# Patient Record
Sex: FEMALE | Race: WHITE
[De-identification: names, ages, dates, MRNs, and addresses within clinical notes are randomized per-mention and may not be internally consistent; named-entity substitution may affect disease eponyms.]

---

## 2021-12-22 ENCOUNTER — HOSPITAL ENCOUNTER (EMERGENCY)
Dept: HOSPITAL 75 - ER | Age: 41
Discharge: HOME | End: 2021-12-22
Payer: MEDICAID

## 2021-12-22 VITALS — HEIGHT: 60.98 IN | WEIGHT: 124.78 LBS | BODY MASS INDEX: 23.56 KG/M2

## 2021-12-22 VITALS — DIASTOLIC BLOOD PRESSURE: 50 MMHG | SYSTOLIC BLOOD PRESSURE: 128 MMHG

## 2021-12-22 DIAGNOSIS — Z20.822: ICD-10-CM

## 2021-12-22 DIAGNOSIS — J10.1: Primary | ICD-10-CM

## 2021-12-22 DIAGNOSIS — F17.210: ICD-10-CM

## 2021-12-22 DIAGNOSIS — J98.01: ICD-10-CM

## 2021-12-22 DIAGNOSIS — R07.81: ICD-10-CM

## 2021-12-22 LAB
ALBUMIN SERPL-MCNC: 4.2 GM/DL (ref 3.2–4.5)
ALP SERPL-CCNC: 56 U/L (ref 40–136)
ALT SERPL-CCNC: 19 U/L (ref 0–55)
APTT BLD: 31 SEC (ref 24–35)
BASOPHILS # BLD AUTO: 0 10^3/UL (ref 0–0.1)
BASOPHILS NFR BLD AUTO: 1 % (ref 0–10)
BILIRUB SERPL-MCNC: 0.2 MG/DL (ref 0.1–1)
BUN/CREAT SERPL: 10
CALCIUM SERPL-MCNC: 9 MG/DL (ref 8.5–10.1)
CHLORIDE SERPL-SCNC: 105 MMOL/L (ref 98–107)
CO2 SERPL-SCNC: 22 MMOL/L (ref 21–32)
CREAT SERPL-MCNC: 0.68 MG/DL (ref 0.6–1.3)
EOSINOPHIL # BLD AUTO: 0 10^3/UL (ref 0–0.3)
EOSINOPHIL NFR BLD AUTO: 0 % (ref 0–10)
GFR SERPLBLD BASED ON 1.73 SQ M-ARVRAT: 94 ML/MIN
GLUCOSE SERPL-MCNC: 77 MG/DL (ref 70–105)
HCT VFR BLD CALC: 42 % (ref 35–52)
HGB BLD-MCNC: 13.7 G/DL (ref 11.5–16)
INR PPP: 1 (ref 0.8–1.4)
LYMPHOCYTES # BLD AUTO: 1.1 10^3/UL (ref 1–4)
LYMPHOCYTES NFR BLD AUTO: 21 % (ref 12–44)
MAGNESIUM SERPL-MCNC: 2.1 MG/DL (ref 1.6–2.4)
MANUAL DIFFERENTIAL PERFORMED BLD QL: NO
MCH RBC QN AUTO: 32 PG (ref 25–34)
MCHC RBC AUTO-ENTMCNC: 33 G/DL (ref 32–36)
MCV RBC AUTO: 96 FL (ref 80–99)
MONOCYTES # BLD AUTO: 0.6 10^3/UL (ref 0–1)
MONOCYTES NFR BLD AUTO: 11 % (ref 0–12)
NEUTROPHILS # BLD AUTO: 3.6 10^3/UL (ref 1.8–7.8)
NEUTROPHILS NFR BLD AUTO: 67 % (ref 42–75)
PLATELET # BLD: 344 10^3/UL (ref 130–400)
PMV BLD AUTO: 8.3 FL (ref 9–12.2)
POTASSIUM SERPL-SCNC: 4.4 MMOL/L (ref 3.6–5)
PROT SERPL-MCNC: 7.3 GM/DL (ref 6.4–8.2)
PROTHROMBIN TIME: 13.5 SEC (ref 12.2–14.7)
SODIUM SERPL-SCNC: 137 MMOL/L (ref 135–145)
WBC # BLD AUTO: 5.4 10^3/UL (ref 4.3–11)

## 2021-12-22 PROCEDURE — 71045 X-RAY EXAM CHEST 1 VIEW: CPT

## 2021-12-22 PROCEDURE — 93005 ELECTROCARDIOGRAM TRACING: CPT

## 2021-12-22 PROCEDURE — 83874 ASSAY OF MYOGLOBIN: CPT

## 2021-12-22 PROCEDURE — 85730 THROMBOPLASTIN TIME PARTIAL: CPT

## 2021-12-22 PROCEDURE — 86141 C-REACTIVE PROTEIN HS: CPT

## 2021-12-22 PROCEDURE — 87636 SARSCOV2 & INF A&B AMP PRB: CPT

## 2021-12-22 PROCEDURE — 36415 COLL VENOUS BLD VENIPUNCTURE: CPT

## 2021-12-22 PROCEDURE — 83880 ASSAY OF NATRIURETIC PEPTIDE: CPT

## 2021-12-22 PROCEDURE — 85379 FIBRIN DEGRADATION QUANT: CPT

## 2021-12-22 PROCEDURE — 85610 PROTHROMBIN TIME: CPT

## 2021-12-22 PROCEDURE — 84703 CHORIONIC GONADOTROPIN ASSAY: CPT

## 2021-12-22 PROCEDURE — 84484 ASSAY OF TROPONIN QUANT: CPT

## 2021-12-22 PROCEDURE — 83735 ASSAY OF MAGNESIUM: CPT

## 2021-12-22 PROCEDURE — 85025 COMPLETE CBC W/AUTO DIFF WBC: CPT

## 2021-12-22 PROCEDURE — 80053 COMPREHEN METABOLIC PANEL: CPT

## 2021-12-22 NOTE — DIAGNOSTIC IMAGING REPORT
INDICATION: Chest pain and chest tightness.



TIME OF EXAM: 9:36 AM



No prior studies are available for comparison.



FINDINGS: The heart size is normal. The pulmonary vascularity is

unremarkable. The lungs are clear. No infiltrate, effusion or

pneumothorax is detected.



IMPRESSION: No acute cardiopulmonary process is detected.



Dictated by: 



  Dictated on workstation # [id number]

## 2021-12-22 NOTE — ED CHEST PAIN
General


Chief Complaint:  Chest Pain


Stated Complaint:  CHEST TIGHTNESS


Nursing Triage Note:  


PT AMB TO RM 4 WITH COMPLAINT OF CHEST TIGHTNESS. STATES STARTED MONDAY AFTER 


PLAYING IN THE PARK WITH CHILDREN.


Source:  patient


Exam Limitations:  no limitations





History of Present Illness


Date Seen by Provider:  Dec 22, 2021


Time Seen by Provider:  09:12


Initial Comments


This 41-year-old woman presents to the emergency room with complaints of chest 

tightness and discomfort, particularly with deep breathing.  She first noticed 

this after playing at the park with her children 2 days ago.  Symptoms have 

persisted.  She denies any fever, chills, nausea, vomiting, diarrhea, or change 

in taste or smell.  She has not been vaccinated for COVID-19 or influenza.  

Symptoms are worse when lying flat and she is not sleeping.  She is a smoker but

denies drug or alcohol use.  She denies any significant health history.  [facility] in 

[place] is her primary care office.  She seems rather fidgety and anxious 

about her condition.





Allergies and Home Medications


Allergies


Coded Allergies:  


     No Known Drug Allergies (Unverified , 12/22/21)





Patient Home Medication List


Home Medication List Reviewed:  Yes


Oseltamivir Phosphate (Tamiflu) 75 Mg Cap, 75 MG PO BID


   Prescribed by: DIMAS ALCANTARA on 12/22/21 1113


Prednisone (Prednisone) 20 Mg Tab, 40 MG PO DAILY


   Prescribed by: DIMAS ALCANTARA on 12/22/21 1113





Review of Systems


Review of Systems


Constitutional:  no symptoms reported


EENTM:  No Symptoms Reported


Respiratory:  See HPI


Cardiovascular:  See HPI


Gastrointestinal:  No Symptoms Reported


Genitourinary:  No Symptoms Reported


Musculoskeletal:  no symptoms reported


Skin:  no symptoms reported


Psychiatric/Neurological:  See HPI


Endocrine:  No Symptoms Reported


Hematologic/Lymphatic:  No Symptoms Reported





Past Medical-Social-Family Hx


Patient Social History


Tobacco Use?:  Yes


Tobacco type used:  Cigarettes


Smoking Status:  Current Everyday Smoker


Use of E-Cig and/or Vaping dev:  No


Substance use?:  No


Alcohol Use?:  No


Pt feels they are or have been:  No





Immunizations Up To Date


Influenza Vaccine Up-to-Date:  No; Not Current





Past Medical History


Surgeries:  No


Respiratory:  No


Cardiac:  No


Neurological:  No


Pregnant:  No


Genitourinary:  No


Gastrointestinal:  No


Musculoskeletal:  No


Endocrine:  No


HEENT:  No


Cancer:  No


Psychosocial:  No


Integumentary:  No





Physical Exam


Vital Signs





Vital Signs - First Documented








 12/22/21





 09:13


 


Temp 36.9


 


Pulse 91


 


Resp 16


 


B/P (MAP) 123/53 (76)


 


Pulse Ox 96


 


O2 Delivery Room Air





Capillary Refill : Less Than 3 Seconds


Height, Weight, BMI


Height: '"


Weight: lbs. oz. kg; 23.00 BMI


Method:


General Appearance:  WD/WN, Anxious


HEENT:  PERRL/EOMI, TMs Normal, Normal ENT Inspection, Pharynx Normal


Neck:  Normal Inspection


Respiratory:  No Accessory Muscle Use, No Respiratory Distress; No Crackles; 

Wheezing, Other (Prolonged expiratory phase)


Cardiovascular:  Regular Rate, Rhythm, No Edema, No Murmur


Gastrointestinal:  Non Tender, Soft; No Distended


Extremity:  Normal Inspection, Non Tender, No Calf Tenderness, No Pedal Edema


Neurologic/Psychiatric:  Alert, Oriented x3, No Motor/Sensory Deficits, CNs II-

XII Norm as Tested, Other (Mildly anxious)


Skin:  Normal Color, Warm/Dry





Progress/Results/Core Measures


Results/Orders


Lab Results





Laboratory Tests








Test


 12/22/21


10:04 12/22/21


10:06 Range/Units


 


 


White Blood Count


 5.4 


 


 4.3-11.0


10^3/uL


 


Red Blood Count


 4.31 


 


 3.80-5.11


10^6/uL


 


Hemoglobin 13.7   11.5-16.0  g/dL


 


Hematocrit 42   35-52  %


 


Mean Corpuscular Volume 96   80-99  fL


 


Mean Corpuscular Hemoglobin 32   25-34  pg


 


Mean Corpuscular Hemoglobin


Concent 33 


 


 32-36  g/dL





 


Red Cell Distribution Width 13.1   10.0-14.5  %


 


Platelet Count


 344 


 


 130-400


10^3/uL


 


Mean Platelet Volume 8.3 L  9.0-12.2  fL


 


Immature Granulocyte % (Auto) 0    %


 


Neutrophils (%) (Auto) 67   42-75  %


 


Lymphocytes (%) (Auto) 21   12-44  %


 


Monocytes (%) (Auto) 11   0-12  %


 


Eosinophils (%) (Auto) 0   0-10  %


 


Basophils (%) (Auto) 1   0-10  %


 


Neutrophils # (Auto)


 3.6 


 


 1.8-7.8


10^3/uL


 


Lymphocytes # (Auto)


 1.1 


 


 1.0-4.0


10^3/uL


 


Monocytes # (Auto)


 0.6 


 


 0.0-1.0


10^3/uL


 


Eosinophils # (Auto)


 0.0 


 


 0.0-0.3


10^3/uL


 


Basophils # (Auto)


 0.0 


 


 0.0-0.1


10^3/uL


 


Immature Granulocyte # (Auto)


 0.0 


 


 0.0-0.1


10^3/uL


 


Prothrombin Time 13.5   12.2-14.7  SEC


 


INR Comment 1.0   0.8-1.4  


 


Activated Partial


Thromboplast Time 31 


 


 24-35  SEC





 


D-Dimer


 < 0.27 


 


 0.00-0.49


UG/ML


 


Sodium Level 137   135-145  MMOL/L


 


Potassium Level 4.4   3.6-5.0  MMOL/L


 


Chloride Level 105     MMOL/L


 


Carbon Dioxide Level 22   21-32  MMOL/L


 


Anion Gap 10   5-14  MMOL/L


 


Blood Urea Nitrogen 7   7-18  MG/DL


 


Creatinine


 0.68 


 


 0.60-1.30


MG/DL


 


Estimat Glomerular Filtration


Rate 94 


 


  





 


BUN/Creatinine Ratio 10    


 


Glucose Level 77     MG/DL


 


Calcium Level 9.0   8.5-10.1  MG/DL


 


Corrected Calcium 8.8   8.5-10.1  MG/DL


 


Magnesium Level 2.1   1.6-2.4  MG/DL


 


Total Bilirubin 0.2   0.1-1.0  MG/DL


 


Aspartate Amino Transf


(AST/SGOT) 22 


 


 5-34  U/L





 


Alanine Aminotransferase


(ALT/SGPT) 19 


 


 0-55  U/L





 


Alkaline Phosphatase 56     U/L


 


Myoglobin


 29.5 


 


 10.0-92.0


NG/ML


 


Troponin I < 0.028   <0.028  NG/ML


 


C-Reactive Protein High


Sensitivity 1.75 H


 


 0.00-0.50


MG/DL


 


B-Type Natriuretic Peptide 33.7   <100.0  PG/ML


 


Total Protein 7.3   6.4-8.2  GM/DL


 


Albumin 4.2   3.2-4.5  GM/DL


 


Serum Pregnancy Test,


Qualitative NEGATIVE 


 


 NEGATIVE  





 


Influenza Type A (RT-PCR)  Detected H Not Detecte  


 


Influenza Type B (RT-PCR)  Not Detected  Not Detecte  


 


SARS-CoV-2 RNA (RT-PCR)  Not Detected  Not Detecte  








My Orders





Orders - DIMAS CRUZ MD


Cbc With Automated Diff (12/22/21 09:12)


Magnesium (12/22/21 09:12)


Chest 1 View, Ap/Pa Only (12/22/21 09:12)


Ekg Tracing (12/22/21 09:12)


Comprehensive Metabolic Panel (12/22/21 09:12)


Myoglobin Serum (12/22/21 09:12)


Protime With Inr (12/22/21 09:12)


Partial Thromboplastin Time (12/22/21 09:12)


O2 (12/22/21 09:12)


Monitor-Rhythm Ecg Trace Only (12/22/21 09:12)


Ed Iv/Invasive Line Start (12/22/21 09:12)


Troponin I [place] (12/22/21 09:12)


Albuterol Inhaler (Albuterol) (12/22/21 09:57)


Covid 19 Inhouse Test (12/22/21 09:57)


Influenza A And B By Pcr (12/22/21 09:57)


Bnp [place] (12/22/21 09:57)


Hs C Reactive Protein (12/22/21 09:57)


Fibrin Degradation Products (12/22/21 09:57)


Hcg,Qualitative Serum (12/22/21 09:57)


Albuterol Inhaler (Albuterol) (12/22/21 09:56)


Ketorolac Injection (Toradol Injection) (12/22/21 11:15)





Medications Given in ED





Current Medications








 Medications  Dose


 Ordered  Sig/Theodore


 Route  Start Time


 Stop Time Status Last Admin


Dose Admin


 


 Ketorolac


 Tromethamine  30 mg  ONCE  ONCE


 IVP  12/22/21 11:15


 12/22/21 11:16 DC 12/22/21 11:23


30 MG








Vital Signs/I&O











 12/22/21 12/22/21





 09:13 11:28


 


Temp 36.9 


 


Pulse 91 86


 


Resp 16 16


 


B/P (MAP) 123/53 (76) 128/50


 


Pulse Ox 96 96


 


O2 Delivery Room Air Room Air














Blood Pressure Mean:                    76











Progress


Progress Note :  


Progress Note


Patient appeared to have bronchospasm on exam.  Inhaler was used and did improve

her breathing.  Cardiac work-up was unremarkable.  Influenza a was detected on 

nasal swabs.  Patient was offered Tamiflu which was prescribed.  She was sent 

home with the inhaler.  Steroids were additionally prescribed.  Patient was 

encouraged to get vaccination for COVID-19 and to quit smoking.


Initial ECG Impression Date:  Dec 22, 2021


Initial ECG Impression Time:  09:29


Initial ECG Rate:  81


Initial ECG Rhythm:  Normal Sinus


Initial ECG Intervals:  Normal


Comment


Sinus rhythm with subtle ST changes likely representing early repolarization in 

this young patient.  No abnormal intervals or axis deviation.  No diagnostic 

ischemic ST elevation or depression.





Diagnostic Imaging





   Diagonstic Imaging:  Xray


   Plain Films/CT/US/NM/MRI:  chest





Departure


Impression





   Primary Impression:  


   Influenza A


   Additional Impressions:  


   Chest pain, pleuritic


   Bronchospasm


Disposition:  01 HOME, SELF-CARE


Condition:  Improved





Departure-Patient Inst.


Decision time for Depature:  11:00


Referrals:  


[facility] (PCP/Family)


Primary Care Physician


Patient Instructions:  Flu





Add. Discharge Instructions:  


Drink plenty of clear liquids to stay well-hydrated.


You may take ibuprofen up to 600 mg every 6 hours and/or Tylenol (acetaminophen)

up to 1000 mg every 6 hours as needed for pain, fever, or chest discomfort.


Use your inhaler up to 4 puffs in a 4-hour period of time for shortness of 

breath or wheezing.


Complete the entire 10 doses of Tamiflu.  Take all of the doses even if you are 

feeling better to avoid rebounding symptoms.


Avoid contact with others to avoid transmitting the flu virus.


Work toward quitting smoking as rapidly as possible.  Seek assistance from your 

primary care provider if you need help.


Consider obtaining Covid and influenza vaccines when you recover from this acute

illness.


Call with questions or concerns.


Return to the ER if you have worsening symptoms.





All discharge instructions reviewed with patient and/or family. Voiced 

understanding.


Scripts


Prednisone (Prednisone) 20 Mg Tab


40 MG PO DAILY, #6 TAB 0 Refills


   Prov: DIMAS CRUZ MD         12/22/21 


Oseltamivir Phosphate (Tamiflu) 75 Mg Cap


75 MG PO BID, #10 CAP


   Prov: DIMAS CRUZ MD         12/22/21





Copy


Copies To 1:   TRUDY WILSON JOSHUA T MD        Dec 22, 2021 11:12

## 2022-05-11 ENCOUNTER — HOSPITAL ENCOUNTER (EMERGENCY)
Dept: HOSPITAL 75 - ER | Age: 42
Discharge: HOME | End: 2022-05-11
Payer: MEDICAID

## 2022-05-11 VITALS — SYSTOLIC BLOOD PRESSURE: 99 MMHG | DIASTOLIC BLOOD PRESSURE: 47 MMHG

## 2022-05-11 VITALS — WEIGHT: 123.46 LBS | BODY MASS INDEX: 22.72 KG/M2 | HEIGHT: 61.81 IN

## 2022-05-11 DIAGNOSIS — N39.0: ICD-10-CM

## 2022-05-11 DIAGNOSIS — Z32.02: ICD-10-CM

## 2022-05-11 DIAGNOSIS — M51.36: Primary | ICD-10-CM

## 2022-05-11 DIAGNOSIS — F17.210: ICD-10-CM

## 2022-05-11 LAB
ALBUMIN SERPL-MCNC: 4.5 GM/DL (ref 3.2–4.5)
ALP SERPL-CCNC: 58 U/L (ref 40–136)
ALT SERPL-CCNC: 15 U/L (ref 0–55)
APTT PPP: YELLOW S
BACTERIA #/AREA URNS HPF: (no result) /HPF
BARBITURATES UR QL: NEGATIVE
BASOPHILS # BLD AUTO: 0.1 10^3/UL (ref 0–0.1)
BASOPHILS NFR BLD AUTO: 1 % (ref 0–10)
BENZODIAZ UR QL SCN: NEGATIVE
BILIRUB SERPL-MCNC: 0.3 MG/DL (ref 0.1–1)
BILIRUB UR QL STRIP: NEGATIVE
BUN/CREAT SERPL: 19
CALCIUM SERPL-MCNC: 9.6 MG/DL (ref 8.5–10.1)
CHLORIDE SERPL-SCNC: 106 MMOL/L (ref 98–107)
CO2 SERPL-SCNC: 25 MMOL/L (ref 21–32)
COCAINE UR QL: NEGATIVE
CREAT SERPL-MCNC: 0.75 MG/DL (ref 0.6–1.3)
EOSINOPHIL # BLD AUTO: 0 10^3/UL (ref 0–0.3)
EOSINOPHIL NFR BLD AUTO: 0 % (ref 0–10)
FIBRINOGEN PPP-MCNC: CLEAR MG/DL
GFR SERPLBLD BASED ON 1.73 SQ M-ARVRAT: 101 ML/MIN
GLUCOSE SERPL-MCNC: 85 MG/DL (ref 70–105)
GLUCOSE UR STRIP-MCNC: NEGATIVE MG/DL
HCG UR QL: NEGATIVE
HCT VFR BLD CALC: 43 % (ref 35–52)
HGB BLD-MCNC: 14.4 G/DL (ref 11.5–16)
KETONES UR QL STRIP: NEGATIVE
LEUKOCYTE ESTERASE UR QL STRIP: NEGATIVE
LYMPHOCYTES # BLD AUTO: 2.8 10^3/UL (ref 1–4)
LYMPHOCYTES NFR BLD AUTO: 33 % (ref 12–44)
MANUAL DIFFERENTIAL PERFORMED BLD QL: NO
MCH RBC QN AUTO: 31 PG (ref 25–34)
MCHC RBC AUTO-ENTMCNC: 33 G/DL (ref 32–36)
MCV RBC AUTO: 94 FL (ref 80–99)
METHADONE UR QL SCN: NEGATIVE
MONOCYTES # BLD AUTO: 0.4 10^3/UL (ref 0–1)
MONOCYTES NFR BLD AUTO: 5 % (ref 0–12)
NEUTROPHILS # BLD AUTO: 5.2 10^3/UL (ref 1.8–7.8)
NEUTROPHILS NFR BLD AUTO: 61 % (ref 42–75)
NITRITE UR QL STRIP: NEGATIVE
OPIATES UR QL SCN: NEGATIVE
OXYCODONE UR QL: NEGATIVE
PH UR STRIP: 6.5 [PH] (ref 5–9)
PLATELET # BLD: 458 10^3/UL (ref 130–400)
PMV BLD AUTO: 8.2 FL (ref 9–12.2)
POTASSIUM SERPL-SCNC: 4.2 MMOL/L (ref 3.6–5)
PROPOXYPH UR QL: NEGATIVE
PROT SERPL-MCNC: 7.5 GM/DL (ref 6.4–8.2)
PROT UR QL STRIP: NEGATIVE
RBC #/AREA URNS HPF: (no result) /HPF
SODIUM SERPL-SCNC: 140 MMOL/L (ref 135–145)
SP GR UR STRIP: <=1.005 (ref 1.02–1.02)
SQUAMOUS #/AREA URNS HPF: (no result) /HPF
TRICYCLICS UR QL SCN: NEGATIVE
WBC # BLD AUTO: 8.6 10^3/UL (ref 4.3–11)
WBC #/AREA URNS HPF: (no result) /HPF

## 2022-05-11 PROCEDURE — 87088 URINE BACTERIA CULTURE: CPT

## 2022-05-11 PROCEDURE — 80053 COMPREHEN METABOLIC PANEL: CPT

## 2022-05-11 PROCEDURE — 72131 CT LUMBAR SPINE W/O DYE: CPT

## 2022-05-11 PROCEDURE — 85025 COMPLETE CBC W/AUTO DIFF WBC: CPT

## 2022-05-11 PROCEDURE — 74176 CT ABD & PELVIS W/O CONTRAST: CPT

## 2022-05-11 PROCEDURE — 81000 URINALYSIS NONAUTO W/SCOPE: CPT

## 2022-05-11 PROCEDURE — 80306 DRUG TEST PRSMV INSTRMNT: CPT

## 2022-05-11 PROCEDURE — 36415 COLL VENOUS BLD VENIPUNCTURE: CPT

## 2022-05-11 PROCEDURE — 84703 CHORIONIC GONADOTROPIN ASSAY: CPT

## 2022-05-11 NOTE — DIAGNOSTIC IMAGING REPORT
PROCEDURE: CT urinary tract, rule out kidney stone.



TECHNIQUE: Multiple contiguous axial images were obtained through

the abdomen and pelvis without the use of intravenous contrast.

Auto Exposure Controls were utilized during the CT exam to meet

ALARA standards for radiation dose reduction. 



INDICATION: Right-sided low back pain beginning yesterday. Right

flank pain. Suspect kidney stones.



COMPARISON: None



FINDINGS:



There is dependent atelectasis in the lung bases. The heart is

normal in size.



The liver demonstrates no focal lesions on this noncontrast exam.

The spleen appears normal. The pancreas is unremarkable. The

adrenal glands appear normal.



The right kidney has a simple appearing cyst which measures 3.5

cm in diameter. There is no hydronephrosis. No obstructing

calculi are seen in the kidneys bilaterally.



The bowel loops are nondistended without obstruction. The

appendix is normal. The colon wall appears mildly thick, but this

is felt to be due to nondistention. No free fluid or free air is

seen. There is mild calcific atherosclerosis, somewhat greater

than expected for age.



No acute osseous abnormality is seen. There are degenerative

changes at L5-S1. Please refer to separate CT of the lumbar

spine.



IMPRESSION:

1. No renal calculi or hydronephrosis. Simple appearing cyst in

the right kidney.

2. Mild calcific atherosclerosis, greater than expected for age.



Dictated by: 



  Dictated on workstation # [id number]

## 2022-05-11 NOTE — DIAGNOSTIC IMAGING REPORT
PROCEDURE: CT lumbar spine without contrast.



TECHNIQUE: Multiple contiguous axial images were obtained through

the lumbar spine without the use of intravenous contrast.

Sagittal and coronal reformations were then performed. Auto

Exposure Controls were utilized during the CT exam to meet ALARA

standards for radiation dose reduction. 



INDICATION: Flank pain.



FINDINGS:



Kidneys are almost entirely in the field-of-view. There is a

partially visualized exophytic right renal cystic appearing

lesion but no radiopaque urinary tract stone. There is no

hydroureteronephrosis. The bladder below the field-of-view.



The aorta is nonaneurysmal. No paravertebral mass, hemorrhage or

fluid collection.



Lumbar statures are normal. The alignment anatomic. The pedicles

and pars are intact. Osteophyte disc material at L2-L3 results in

no significant canal or foraminal stenosis. Minimal degenerative

changes at L3-L4 and L4-L5 also without stenosis.



Substantial degenerative changes at the L5-S1 with the disc space

narrowing, endplate sclerosis, and marginal osteophytes. Findings

result in no significant canal stenosis, however, result in

moderate-to-severe left and mild-to-moderate right neural

foraminal narrowing.



IMPRESSION:

1. Normal alignment. No acute lumbar pathology. No fracture

identified. 

2. Degenerative changes greatest at the L5-S1 levels result in

left greater than right foraminal stenoses on a chronic basis. 

3. Cystic lesion right kidney partially visualized.



Dictated by: 



  Dictated on workstation # [id number]

## 2022-05-11 NOTE — ED BACK PAIN
General


Chief Complaint:  Back Problems


Stated Complaint:  PINCH NERVE


Nursing Triage Note:  


ARRIVED VIA WC TO ROOM 09 WITH RIGHT SIDED LOWER BACK PAIN STARTING YESTERDAY 


AM.  SHE THINKS SHE HAS PINCHED NERVE IN HER BACK.


Source of Information:  Patient





History of Present Illness


Date Seen by Provider:  May 11, 2022


Time Seen by Provider:  08:55


Initial Comments


PT ARRIVES VIA POV FROM HOME


C/O RIGHT LOWER BACK PAIN AND LOWER LUMBAR PAIN SINCE YESTERDAY MORNING, ON 

WAKING


NO RADIATION OF PAIN 


NO PARESTHESIAS OR MOTOR DEFICITS


NO LOSS OF BOWEL OR BLADDER CONTROL


NO URINARY SYMPTOMS


NO NAUSEA/VOMITING





NO INJURY OR UNUSUAL ACTIVITY. 





NO HISTORY OF SIMILAR


NO CHRONIC MEDICAL PROBLEMS





NO RELIEF WITH IBUPROFEN AT 0400 THIS AM





LMP--ENDED YESTERDAY. NORMAL. NO BIRTH CONTROL


Other Comments





PCP: JARON





Allergies and Home Medications


Allergies


Coded Allergies:  


     No Known Drug Allergies (Unverified , 12/22/21)





Patient Home Medication List


Nitrofurantoin Monohyd/M-Cryst (Macrobid 100 mg Capsule) 100 Mg Capsule, 1 TAB 

PO BID


   Prescribed by: SHALINI MCNULTY on 5/11/22 0957


Oseltamivir Phosphate (Tamiflu) 75 Mg Cap, 75 MG PO BID


   Prescribed by: DIMAS ALCANTARA on 12/22/21 1113


Prednisone (Prednisone) 20 Mg Tab, 40 MG PO DAILY


   Prescribed by: DIMAS ALCANTARA on 12/22/21 1113


Prednisone (Prednisone) 10 Mg Tab.ds.pk, 10 MG PO DAILY


   Prescribed by: SHALINI MCNULTY on 5/11/22 0957


Tizanidine HCl (Zanaflex) 4 Mg Capsule, 4 MG PO TID


   Prescribed by: SHALINI MCNULTY on 5/11/22 0957





Review of Systems


Constitutional:  no symptoms reported


Respiratory:  no symptoms reported


Cardiovascular:  no symptoms reported


Gastrointestinal:  no symptoms reported


Genitourinary:  no symptoms reported


Pregnant:  No


LMP:  May 4, 2022


Birth Control/STD Prophylaxis:  None


Musculoskeletal:  see HPI, back pain


Skin:  no symptoms reported


Psychiatric/Neurological:  No Symptoms Reported





Past Medical-Social-Family Hx


Patient Social History


Tobacco Use?:  Yes


Tobacco type used:  Cigarettes


Smoking Status:  Current Everyday Smoker


Substance use?:  No


Alcohol Use?:  Yes


Alcohol Frequency:  Once in a while





Past Medical History


Surgeries:  No


Respiratory:  No


Cardiac:  No


Neurological:  No


Genitourinary:  No


Gastrointestinal:  No


Musculoskeletal:  No


Endocrine:  No


HEENT:  No


Cancer:  No


Psychosocial:  No


Integumentary:  No


Blood Disorders:  No





Physical Exam


Vital Signs





Vital Signs - First Documented








 5/11/22





 08:45


 


Temp 36.1


 


Pulse 91


 


Resp 16


 


B/P (MAP) 98/70 (79)


 


Pulse Ox 95


 


O2 Delivery Room Air





Capillary Refill : Less Than 3 Seconds


Height, Weight, BMI


Height: '"


Weight: lbs. oz. kg; 22.00 BMI


Method:


General Appearance:  WD/WN, Other (LOOKS UNCOMFORTABLY, LAYING PARTIALLY ON LEFT

SIDE. )


Neck:  Normal Inspection


Cardiovascular:  Regular Rate, Rhythm, No Murmur


Respiratory:  Normal Breath Sounds


Gastrointestinal:  Non Tender, Soft


Back:  Other (LOWER LUMBAR TENDERNESS, AND PIN POINT TENDERNESS OVER RIGHT SI 

JOINT. )


Neurologic/Psychiatric:  Alert, Oriented x3, No Motor/Sensory Deficits, CNs II-

XII Norm as Tested


Skin:  Normal Color, Warm/Dry; No Rash; Tattoos/Piercings (MULITPLE TATTOOS)





Progress/Results/Core Measures


Results/Orders


Lab Results





Laboratory Tests








Test


 5/11/22


08:58 5/11/22


09:06 Range/Units


 


 


Urine Color YELLOW    


 


Urine Clarity CLEAR    


 


Urine pH 6.5   5-9  


 


Urine Specific Gravity <=1.005   1.016-1.022  


 


Urine Protein NEGATIVE   NEGATIVE  


 


Urine Glucose (UA) NEGATIVE   NEGATIVE  


 


Urine Ketones NEGATIVE   NEGATIVE  


 


Urine Nitrite NEGATIVE   NEGATIVE  


 


Urine Bilirubin NEGATIVE   NEGATIVE  


 


Urine Urobilinogen 0.2   < = 1.0  MG/DL


 


Urine Leukocyte Esterase NEGATIVE   NEGATIVE  


 


Urine RBC (Auto) 1+ H  NEGATIVE  


 


Urine RBC 5-10 H   /HPF


 


Urine WBC 0-2    /HPF


 


Urine Squamous Epithelial


Cells 5-10 


 


  /HPF





 


Urine Crystals NONE    /LPF


 


Urine Bacteria MODERATE H   /HPF


 


Urine Casts NONE    /LPF


 


Urine Mucus NEGATIVE    /LPF


 


Urine Culture Indicated YES    


 


Urine Pregnancy Test NEGATIVE   NEGATIVE  


 


White Blood Count


 


 8.6 


 4.3-11.0


10^3/uL


 


Red Blood Count


 


 4.59 


 3.80-5.11


10^6/uL


 


Hemoglobin  14.4  11.5-16.0  g/dL


 


Hematocrit  43  35-52  %


 


Mean Corpuscular Volume  94  80-99  fL


 


Mean Corpuscular Hemoglobin  31  25-34  pg


 


Mean Corpuscular Hemoglobin


Concent 


 33 


 32-36  g/dL





 


Red Cell Distribution Width  13.1  10.0-14.5  %


 


Platelet Count


 


 458 H


 130-400


10^3/uL


 


Mean Platelet Volume  8.2 L 9.0-12.2  fL


 


Immature Granulocyte % (Auto)  0   %


 


Neutrophils (%) (Auto)  61  42-75  %


 


Lymphocytes (%) (Auto)  33  12-44  %


 


Monocytes (%) (Auto)  5  0-12  %


 


Eosinophils (%) (Auto)  0  0-10  %


 


Basophils (%) (Auto)  1  0-10  %


 


Neutrophils # (Auto)


 


 5.2 


 1.8-7.8


10^3/uL


 


Lymphocytes # (Auto)


 


 2.8 


 1.0-4.0


10^3/uL


 


Monocytes # (Auto)


 


 0.4 


 0.0-1.0


10^3/uL


 


Eosinophils # (Auto)


 


 0.0 


 0.0-0.3


10^3/uL


 


Basophils # (Auto)


 


 0.1 


 0.0-0.1


10^3/uL


 


Immature Granulocyte # (Auto)


 


 0.0 


 0.0-0.1


10^3/uL


 


Sodium Level  140  135-145  MMOL/L


 


Potassium Level  4.2  3.6-5.0  MMOL/L


 


Chloride Level  106    MMOL/L


 


Carbon Dioxide Level  25  21-32  MMOL/L


 


Anion Gap  9  5-14  MMOL/L


 


Blood Urea Nitrogen  14  7-18  MG/DL


 


Creatinine


 


 0.75 


 0.60-1.30


MG/DL


 


Estimat Glomerular Filtration


Rate 


 101 


  





 


BUN/Creatinine Ratio  19   


 


Glucose Level  85    MG/DL


 


Calcium Level  9.6  8.5-10.1  MG/DL


 


Corrected Calcium  9.2  8.5-10.1  MG/DL


 


Total Bilirubin  0.3  0.1-1.0  MG/DL


 


Aspartate Amino Transf


(AST/SGOT) 


 15 


 5-34  U/L





 


Alanine Aminotransferase


(ALT/SGPT) 


 15 


 0-55  U/L





 


Alkaline Phosphatase  58    U/L


 


Total Protein  7.5  6.4-8.2  GM/DL


 


Albumin  4.5  3.2-4.5  GM/DL








My Orders





Orders - SHALINI MCNULTY DO


Urine Pregnancy Bedside (5/11/22 08:55)


Ua Culture If Indicated (5/11/22 08:55)


Ed Iv/Invasive Line Start (5/11/22 08:58)


Cbc With Automated Diff (5/11/22 08:58)


Comprehensive Metabolic Panel (5/11/22 08:58)


Drug Screen Stat (Urine) (5/11/22 08:58)


Hcg,Qualitative Urine (5/11/22 08:58)


Urine Culture (5/11/22 08:58)


Ct Abd/Pelvis Wo(Kidney Stone) (5/11/22 09:14)


Ct Lumbar Spine Wo (5/11/22 09:14)


Ketorolac Injection (Toradol Injection) (5/11/22 09:30)


Orphenadrine Inj (Ed Only) (Norflex Inje (5/11/22 09:30)





Medications Given in ED





Current Medications








 Medications  Dose


 Ordered  Sig/Theodore


 Route  Start Time


 Stop Time Status Last Admin


Dose Admin


 


 Ketorolac


 Tromethamine  30 mg  ONCE  ONCE


 IVP  5/11/22 09:30


 5/11/22 09:31 DC 5/11/22 09:36


30 MG


 


 Orphenadrine


 Citrate  60 mg  ONCE  ONCE


 IV  5/11/22 09:30


 5/11/22 09:31 DC 5/11/22 09:36


60 MG








Vital Signs/I&O











 5/11/22





 08:45


 


Temp 36.1


 


Pulse 91


 


Resp 16


 


B/P (MAP) 98/70 (79)


 


Pulse Ox 95


 


O2 Delivery Room Air














Blood Pressure Mean:                    79











Departure


Impression





   Primary Impression:  


   Lumbar degenerative disc disease


   Additional Impression:  


   UTI (urinary tract infection)


Disposition:  01 HOME, SELF-CARE


Condition:  Stable





Departure-Patient Inst.


Decision time for Depature:  09:55


Referrals:  


[facility] (PCP/Family)


Primary Care Physician


Patient Instructions:  Urinary Tract Infection, Adult ED, Degenerative Disc 

Disease (DC)





Add. Discharge Instructions:  


ALTERNATE ICE AND HEAT TO SORE AREA AT 20 MINUTE INTERVALS





NO LIFTING OVER 5 LBS, NO TWISTING OR BENDING AT WAIST





FOLLOW UP WITH [facility] IN 1-2 DAYS FOR FURTHER CARE--CALL TODAY TO SCHEDULE 

APPOINTMENT





All discharge instructions reviewed with patient and/or family. Voiced 

understanding.


Scripts


Tramadol HCl (Ultram) 50 Mg Tablet


50 MG PO Q4H for Pain, #20 TAB


   Prov: SHRUTI MCNULTYA K DO         5/11/22 


Nitrofurantoin Monohyd/M-Cryst (Macrobid 100 mg Capsule) 100 Mg Capsule


1 TAB PO BID, #20 CAP


   Prov: HAMLET,SHALINI K DO         5/11/22 


Tizanidine HCl (Zanaflex) 4 Mg Capsule


4 MG PO TID for Spasms, #15 CAP


   Prov: SHALINI MCNULTY DO         5/11/22 


Prednisone (Prednisone) 10 Mg Tab.ds.pk


10 MG PO DAILY, #42 EA


   Take 6 tabs(60mg)daily,decrease by 1 tab(10mg)every other day.


   Prov: SHALINI MCNULTY DO         5/11/22











SHALINI MCNULTY DO                 May 11, 2022 09:04

## 2023-03-23 ENCOUNTER — HOSPITAL ENCOUNTER (EMERGENCY)
Dept: HOSPITAL 75 - ER | Age: 43
Discharge: HOME | End: 2023-03-23
Payer: MEDICAID

## 2023-03-23 VITALS — SYSTOLIC BLOOD PRESSURE: 114 MMHG | DIASTOLIC BLOOD PRESSURE: 77 MMHG

## 2023-03-23 DIAGNOSIS — F45.8: ICD-10-CM

## 2023-03-23 DIAGNOSIS — F17.210: ICD-10-CM

## 2023-03-23 DIAGNOSIS — U07.1: Primary | ICD-10-CM

## 2023-03-23 DIAGNOSIS — E86.9: ICD-10-CM

## 2023-03-23 DIAGNOSIS — K02.9: ICD-10-CM

## 2023-03-23 DIAGNOSIS — Z28.310: ICD-10-CM

## 2023-03-23 LAB
ALBUMIN SERPL-MCNC: 4.3 GM/DL (ref 3.2–4.5)
ALP SERPL-CCNC: 46 U/L (ref 40–136)
ALT SERPL-CCNC: 18 U/L (ref 0–55)
AMORPH SED URNS QL MICRO: (no result) /LPF
APTT PPP: YELLOW S
BACTERIA #/AREA URNS HPF: (no result) /HPF
BARBITURATES UR QL: NEGATIVE
BASOPHILS # BLD AUTO: 0 10^3/UL (ref 0–0.1)
BASOPHILS NFR BLD AUTO: 0 % (ref 0–10)
BENZODIAZ UR QL SCN: NEGATIVE
BILIRUB SERPL-MCNC: 0.4 MG/DL (ref 0.1–1)
BILIRUB UR QL STRIP: NEGATIVE
BUN/CREAT SERPL: 18
CALCIUM SERPL-MCNC: 9.2 MG/DL (ref 8.5–10.1)
CHLORIDE SERPL-SCNC: 105 MMOL/L (ref 98–107)
CK MB SERPL-MCNC: 2.3 NG/ML (ref ?–6.6)
CK SERPL-CCNC: 181 U/L (ref 29–168)
CO2 SERPL-SCNC: 21 MMOL/L (ref 21–32)
COCAINE UR QL: NEGATIVE
CREAT SERPL-MCNC: 0.74 MG/DL (ref 0.6–1.3)
EOSINOPHIL # BLD AUTO: 0.3 10^3/UL (ref 0–0.3)
EOSINOPHIL NFR BLD AUTO: 3 % (ref 0–10)
FIBRINOGEN PPP-MCNC: CLEAR MG/DL
GFR SERPLBLD BASED ON 1.73 SQ M-ARVRAT: 102 ML/MIN
GLUCOSE SERPL-MCNC: 134 MG/DL (ref 70–105)
GLUCOSE UR STRIP-MCNC: NEGATIVE MG/DL
HCT VFR BLD CALC: 38 % (ref 35–52)
HGB BLD-MCNC: 13.1 G/DL (ref 11.5–16)
KETONES UR QL STRIP: NEGATIVE
LEUKOCYTE ESTERASE UR QL STRIP: NEGATIVE
LYMPHOCYTES # BLD AUTO: 3.9 10^3/UL (ref 1–4)
LYMPHOCYTES NFR BLD AUTO: 39 % (ref 12–44)
MAGNESIUM SERPL-MCNC: 2 MG/DL (ref 1.6–2.4)
MANUAL DIFFERENTIAL PERFORMED BLD QL: NO
MCH RBC QN AUTO: 32 PG (ref 25–34)
MCHC RBC AUTO-ENTMCNC: 34 G/DL (ref 32–36)
MCV RBC AUTO: 92 FL (ref 80–99)
METHADONE UR QL SCN: NEGATIVE
MONOCYTES # BLD AUTO: 0.7 10^3/UL (ref 0–1)
MONOCYTES NFR BLD AUTO: 7 % (ref 0–12)
NEUTROPHILS # BLD AUTO: 5.1 10^3/UL (ref 1.8–7.8)
NEUTROPHILS NFR BLD AUTO: 51 % (ref 42–75)
NITRITE UR QL STRIP: NEGATIVE
OPIATES UR QL SCN: NEGATIVE
OXYCODONE UR QL: NEGATIVE
PH UR STRIP: 7 [PH] (ref 5–9)
PLATELET # BLD: 344 10^3/UL (ref 130–400)
PMV BLD AUTO: 8.5 FL (ref 9–12.2)
POTASSIUM SERPL-SCNC: 3.6 MMOL/L (ref 3.6–5)
PROPOXYPH UR QL: NEGATIVE
PROT SERPL-MCNC: 6.8 GM/DL (ref 6.4–8.2)
PROT UR QL STRIP: (no result)
RBC #/AREA URNS HPF: (no result) /HPF
SODIUM SERPL-SCNC: 138 MMOL/L (ref 135–145)
SP GR UR STRIP: 1.02 (ref 1.02–1.02)
SQUAMOUS #/AREA URNS HPF: (no result) /HPF
T4 FREE SERPL-MCNC: 1.12 NG/DL (ref 0.7–1.48)
TRICYCLICS UR QL SCN: NEGATIVE
TSH SERPL DL<=0.05 MIU/L-ACNC: 0.18 UIU/ML (ref 0.35–4.94)
WBC # BLD AUTO: 10 10^3/UL (ref 4.3–11)
WBC #/AREA URNS HPF: (no result) /HPF

## 2023-03-23 PROCEDURE — 80306 DRUG TEST PRSMV INSTRMNT: CPT

## 2023-03-23 PROCEDURE — 81000 URINALYSIS NONAUTO W/SCOPE: CPT

## 2023-03-23 PROCEDURE — 80053 COMPREHEN METABOLIC PANEL: CPT

## 2023-03-23 PROCEDURE — 93005 ELECTROCARDIOGRAM TRACING: CPT

## 2023-03-23 PROCEDURE — 93041 RHYTHM ECG TRACING: CPT

## 2023-03-23 PROCEDURE — 84484 ASSAY OF TROPONIN QUANT: CPT

## 2023-03-23 PROCEDURE — 87088 URINE BACTERIA CULTURE: CPT

## 2023-03-23 PROCEDURE — 80320 DRUG SCREEN QUANTALCOHOLS: CPT

## 2023-03-23 PROCEDURE — 87636 SARSCOV2 & INF A&B AMP PRB: CPT

## 2023-03-23 PROCEDURE — 85025 COMPLETE CBC W/AUTO DIFF WBC: CPT

## 2023-03-23 PROCEDURE — 82550 ASSAY OF CK (CPK): CPT

## 2023-03-23 PROCEDURE — 99284 EMERGENCY DEPT VISIT MOD MDM: CPT

## 2023-03-23 PROCEDURE — 36415 COLL VENOUS BLD VENIPUNCTURE: CPT

## 2023-03-23 PROCEDURE — 83735 ASSAY OF MAGNESIUM: CPT

## 2023-03-23 PROCEDURE — 84439 ASSAY OF FREE THYROXINE: CPT

## 2023-03-23 PROCEDURE — 83874 ASSAY OF MYOGLOBIN: CPT

## 2023-03-23 PROCEDURE — 82553 CREATINE MB FRACTION: CPT

## 2023-03-23 PROCEDURE — 84443 ASSAY THYROID STIM HORMONE: CPT

## 2023-03-23 PROCEDURE — 84703 CHORIONIC GONADOTROPIN ASSAY: CPT

## 2023-03-23 NOTE — ED GENERAL
General


Chief Complaint:  Chest Pain


Stated Complaint:  BODY DOESN'T FEEL RIGHT


Nursing Triage Note:  


PT TO RM 6 BY  WITH C/O "NOT FEELING RIGHT" SINCE ABOUT 1 HOUR PTA. PT STATES 


HER LEGS FEEL "FUNNY", DIZZINESS, CHEST TIGHTNESS, AND NAUSEA WHEN STANDING. PT 


BENT OVER IN WC PRIOR TO TRIAGE. PT REPORTS HAS TOOTH ABCESS/PAIN AND TAKES 


AMOXICILLIN, IS SCHEDULED TO SEE DENTIST. PT CRYING AT TIME OF TRIAGE.


Source of Information:  Patient





History of Present Illness


Date Seen by Provider:  Mar 23, 2023


Time Seen by Provider:  20:17


Initial Comments


PT ARRIVES VIA POV FROM HOME, WITH A MALE, WANTS WHEELCHAIR ON ARRIVAL


PT STATES SHE FELT FINE AT WORK TODAY, CAME HOME, AND THEN STARTED HAVING SUDDEN

SEVERE PAIN IN HER TOOTH


STATES SHE HAS CHRONIC DENTAL PROBLEMS AND LEFT LOWER MOLAR TOOTH OR TEETH 

"BROKE OFF" A FEW WEEKS AGO. 


SHE HAS AN APPOINTMENT 04/05/23 WITH UNKNOWN DENTIST. 


SHE STARTED TAKING LEFT OVER AMOXIL FROM PRIOR PROBLEM WITH THIS TOOTH/TEETH. 

TOOK 1 DOSE TONIGHT JUST PRIOR TO ARRIVAL ( PER MED RECONCILIATION, AMOXIL 

PRESCRIBED 02/24/23 BY NP AT [facility] ) 


SHE ALSO TOOK IBUPROFEN 1 HOUR AGO. 





SHE HAS TAKEN BOTH OF THESE MEDICATIONS MANY TIMES AND NEVER HAD PROBLEMS





AFTER HER TOOTH STARTED HURTING, SHE BEGAN TO "FEEL FUNNY ALL OVER" STATES "MY 

BODY JUST DOESN'T FEEL RIGHT" 


STATES " MY LEGS FEEL WARM, AND SOMETIMES IT FEELS A LITTLE TINGLY IN MY CALVES 

AND FEET"


THEN SHE STARTED FEELING NAUSEATED, NO  VOMITING. NO DIARRHEA. NO ABDOMINAL PAIN




STATES SHE STARTED FEELING DIZZY AND ON ARRIVAL STATES HER CHEST IS NOW STARTING

TO FEEL A LITTLE TIGHT. 





PT IS CRYING UNCONTROLLABLY AND HYPERVENTILATING AND NEAR-HYSTERIA ON ARRIVAL. 


SHE ARRIVES LEANING OVER THE SIDE OF THE WHEELCHAIR





SHE HAS NOT BEEN ILL IN ANY OTHER WAY RECENTLY. 


SHE DENIES ANY CHRONIC MEDICAL PROBLEMS, AND DOES NOT TAKE ANY DAILY MEDICATIONS





LMP--2-3 WEEKS AGO, NORMAL. NO BIRTH CONTROL.





PCP: [facility]





Allergies and Home Medications


Allergies


Coded Allergies:  


     No Known Drug Allergies (Unverified , 12/22/21)





Patient Home Medication List


Amoxicillin (Amoxicillin) 875 Mg Tablet, 875 MG PO BID


   Prescribed by: SHALINI MCNULTY on 3/23/23 4360


Hydroxyzine Pamoate (Hydroxyzine Pamoate) 50 Mg Capsule, 50 MG PO Q6H PRN for 

ANXIETY


   Prescribed by: SHALINI MCNULTY on 3/23/23 2238


Nitrofurantoin Monohyd/M-Cryst (Macrobid 100 mg Capsule) 100 Mg Capsule, 1 TAB 

PO BID


   Prescribed by: SHLAINI MCNULTY on 5/11/22 0957


Oseltamivir Phosphate (Tamiflu) 75 Mg Cap, 75 MG PO BID


   Prescribed by: DIMAS ALCANTARA on 12/22/21 1113


Prednisone (Prednisone) 20 Mg Tab, 40 MG PO DAILY


   Prescribed by: DIMAS ALCANTARA on 12/22/21 1113


Prednisone (Prednisone) 10 Mg Tab.ds.pk, 10 MG PO DAILY


   Prescribed by: SHALINI MCNULTY on 5/11/22 0957


Tizanidine HCl (Zanaflex) 4 Mg Capsule, 4 MG PO TID


   Prescribed by: SHALINI MCNULTY on 5/11/22 0957


Tramadol HCl (Ultram) 50 Mg Tablet, 50 MG PO Q4H


   Prescribed by: SHALINI MCNULTY on 5/11/22 0958





Past Medical-Social-Family Hx


Patient Social History


Tobacco Use?:  Yes


Tobacco type used:  Cigarettes


Smoking Status:  Current Everyday Smoker


Substance use?:  Yes


Substance type:  Marijuana


Alcohol Use?:  No


Pt feels they are or have been:  No





Past Medical History


Surgeries:  No


Respiratory:  No


Cardiac:  No


Neurological:  No


Genitourinary:  No


Gastrointestinal:  No


Musculoskeletal:  No


Endocrine:  No


HEENT:  No


Cancer:  No


Psychosocial:  No


Integumentary:  No


Blood Disorders:  No





Physical Exam


Vital Signs





Vital Signs - First Documented








 3/23/23





 20:16


 


Pulse 84


 


Resp 22


 


B/P (MAP) 143/100 (114)


 


Pulse Ox 99


 


O2 Delivery Room Air





Capillary Refill : Less Than 3 Seconds


Height, Weight, BMI


Height: '"


Weight: lbs. oz. kg;  BMI


Method:


General Appearance:  Other (BEHAVIOR AS NOTED ABOVE. PT WITH RAPID, ERRATIC 

SPEECH, SOMEWHAT TANGENTIAL. CRYING UNCONTROLLABLY AND HYPERVENTILATING. 

CONSTANT MOVEMENTS. . KEEPS EYES CLOSED, DOES NOT MAKE EYE CONTACT AT ANY TIME. 

)


HEENT:  PERRL/EOMI, Other (LEFT LOWER 2ND AND 3RD MOLARS DECAYED DOWN TO GUMS, 

WITH MILD ADJACENT GUM INFLAMMATION)


Neck:  Normal Inspection


Respiratory:  Normal Breath Sounds, No Accessory Muscle Use, No Respiratory 

Distress


Cardiovascular:  Regular Rate, Rhythm, No Murmur


Gastrointestinal:  Non Tender, Soft


Back:  No CVA Tenderness


Extremity:  Normal Capillary Refill, Normal Inspection, Normal Range of Motion, 

Non Tender, No Calf Tenderness


Neurologic/Psychiatric:  Alert, Oriented x3, No Motor/Sensory Deficits, CNs II-

XII Norm as Tested


Skin:  Normal Color, Warm/Dry; No Rash; Tattoos/Piercings





Progress/Results/Core Measures


Suspected Sepsis


SIRS


Temperature: 


Pulse: 84 


Respiratory Rate: 22


 


Laboratory Tests


3/23/23 20:25: White Blood Count 10.0


Blood Pressure 143 /100 


Mean: 114


 


Laboratory Tests


3/23/23 20:25: 


Creatinine 0.74, Platelet Count 344, Total Bilirubin 0.4








Results/Orders


Lab Results





Laboratory Tests








Test


 3/23/23


20:25 3/23/23


20:47 3/23/23


21:04 Range/Units


 


 


White Blood Count


 10.0 


 


 


 4.3-11.0


10^3/uL


 


Red Blood Count


 4.14 


 


 


 3.80-5.11


10^6/uL


 


Hemoglobin 13.1    11.5-16.0  g/dL


 


Hematocrit 38    35-52  %


 


Mean Corpuscular Volume 92    80-99  fL


 


Mean Corpuscular Hemoglobin 32    25-34  pg


 


Mean Corpuscular Hemoglobin


Concent 34 


 


 


 32-36  g/dL





 


Red Cell Distribution Width 12.8    10.0-14.5  %


 


Platelet Count


 344 


 


 


 130-400


10^3/uL


 


Mean Platelet Volume 8.5 L   9.0-12.2  fL


 


Immature Granulocyte % (Auto) 0     %


 


Neutrophils (%) (Auto) 51    42-75  %


 


Lymphocytes (%) (Auto) 39    12-44  %


 


Monocytes (%) (Auto) 7    0-12  %


 


Eosinophils (%) (Auto) 3    0-10  %


 


Basophils (%) (Auto) 0    0-10  %


 


Neutrophils # (Auto)


 5.1 


 


 


 1.8-7.8


10^3/uL


 


Lymphocytes # (Auto)


 3.9 


 


 


 1.0-4.0


10^3/uL


 


Monocytes # (Auto)


 0.7 


 


 


 0.0-1.0


10^3/uL


 


Eosinophils # (Auto)


 0.3 


 


 


 0.0-0.3


10^3/uL


 


Basophils # (Auto)


 0.0 


 


 


 0.0-0.1


10^3/uL


 


Immature Granulocyte # (Auto)


 0.0 


 


 


 0.0-0.1


10^3/uL


 


Sodium Level 138    135-145  MMOL/L


 


Potassium Level 3.6    3.6-5.0  MMOL/L


 


Chloride Level 105      MMOL/L


 


Carbon Dioxide Level 21    21-32  MMOL/L


 


Anion Gap 12    5-14  MMOL/L


 


Blood Urea Nitrogen 13    7-18  MG/DL


 


Creatinine


 0.74 


 


 


 0.60-1.30


MG/DL


 


Estimat Glomerular Filtration


Rate 102 


 


 


  





 


BUN/Creatinine Ratio 18     


 


Glucose Level 134 H     MG/DL


 


Calcium Level 9.2    8.5-10.1  MG/DL


 


Corrected Calcium 9.0    8.5-10.1  MG/DL


 


Magnesium Level 2.0    1.6-2.4  MG/DL


 


Total Bilirubin 0.4    0.1-1.0  MG/DL


 


Aspartate Amino Transf


(AST/SGOT) 19 


 


 


 5-34  U/L





 


Alanine Aminotransferase


(ALT/SGPT) 18 


 


 


 0-55  U/L





 


Alkaline Phosphatase 46      U/L


 


Total Creatine Kinase 181 H     U/L


 


Creatine Kinase MB 2.3    <6.6  NG/ML


 


Myoglobin


 30.2 


 


 


 10.0-92.0


NG/ML


 


Troponin I < 0.028    <0.028  NG/ML


 


Total Protein 6.8    6.4-8.2  GM/DL


 


Albumin 4.3    3.2-4.5  GM/DL


 


Free Thyroxine


 1.12 


 


 


 0.70-1.48


NG/DL


 


TSH Cascade Testing


 0.18 L


 


 


 0.35-4.94


UIU/ML


 


Serum Pregnancy Test,


Qualitative NEGATIVE 


 


 


 NEGATIVE  





 


Serum Alcohol < 10    <10  MG/DL


 


Urine Color  YELLOW    


 


Urine Clarity  CLEAR    


 


Urine pH  7.0   5-9  


 


Urine Specific Gravity  1.020   1.016-1.022  


 


Urine Protein  TRACE H  NEGATIVE  


 


Urine Glucose (UA)  NEGATIVE   NEGATIVE  


 


Urine Ketones  NEGATIVE   NEGATIVE  


 


Urine Nitrite  NEGATIVE   NEGATIVE  


 


Urine Bilirubin  NEGATIVE   NEGATIVE  


 


Urine Urobilinogen  1.0   < = 1.0  MG/DL


 


Urine Leukocyte Esterase  NEGATIVE   NEGATIVE  


 


Urine RBC (Auto)  NEGATIVE   NEGATIVE  


 


Urine RBC  NONE    /HPF


 


Urine WBC  RARE    /HPF


 


Urine Squamous Epithelial


Cells 


 25-50 H


 


  /HPF





 


Urine Crystals  PRESENT H   /LPF


 


Urine Amorphous Sediment


 


 LARGE LOPEZ


URATES H 


  /LPF





 


Urine Bacteria  MODERATE H   /HPF


 


Urine Casts  NONE    /LPF


 


Urine Mucus  SMALL H   /LPF


 


Urine Culture Indicated  YES    


 


Urine Opiates Screen  NEGATIVE   NEGATIVE  


 


Urine Oxycodone Screen  NEGATIVE   NEGATIVE  


 


Urine Methadone Screen  NEGATIVE   NEGATIVE  


 


Urine Propoxyphene Screen  NEGATIVE   NEGATIVE  


 


Urine Barbiturates Screen  NEGATIVE   NEGATIVE  


 


Ur Tricyclic Antidepressants


Screen 


 NEGATIVE 


 


 NEGATIVE  





 


Urine Phencyclidine Screen  NEGATIVE   NEGATIVE  


 


Urine Amphetamines Screen  NEGATIVE   NEGATIVE  


 


Urine Methamphetamines Screen  NEGATIVE   NEGATIVE  


 


Urine Benzodiazepines Screen  NEGATIVE   NEGATIVE  


 


Urine Cocaine Screen  NEGATIVE   NEGATIVE  


 


Urine Cannabinoids Screen  POSITIVE H  NEGATIVE  


 


Influenza Type A (RT-PCR)   Not Detected  Not Detecte  


 


Influenza Type B (RT-PCR)   Not Detected  Not Detecte  


 


SARS-CoV-2 RNA (RT-PCR)   Detected H Not Detecte  








My Orders





Orders - SHALINI MCNULTY DO


Ed Iv/Invasive Line Start (3/23/23 20:16)


Urine Pregnancy Bedside (3/23/23 20:16)


Monitor-Rhythm Ecg Trace Only (3/23/23 20:16)


Alcohol (3/23/23 20:16)


Cbc With Automated Diff (3/23/23 20:16)


Comprehensive Metabolic Panel (3/23/23 20:16)


Drug Screen Stat (Urine) (3/23/23 20:16)


Magnesium (3/23/23 20:16)


Thyroid Analyzer (3/23/23 20:16)


Ua Culture If Indicated (3/23/23 20:16)


Covid 19 Inhouse Test (3/23/23 20:16)


Influenza A And B By Pcr (3/23/23 20:16)


Isolation Central Supply Req (3/23/23 20:16)


Ekg Tracing (3/23/23 20:22)


Creatine Kinase (3/23/23 20:33)


Creatine Kinase Mb (3/23/23 20:33)


Myoglobin Serum (3/23/23 20:33)


Troponin I Alex (3/23/23 20:33)


Hcg,Qualitative Serum (3/23/23 20:33)


Ed Iv/Invasive Line Start (3/23/23 20:33)


Lactated Ringers (Lr 1000 Ml Iv Solution (3/23/23 20:45)


Ondansetron Injection (Zofran Injectio (3/23/23 20:45)


Ketorolac Injection (Toradol Injection) (3/23/23 21:15)


Diphenhydramine Injection (Benadryl Inje (3/23/23 21:15)


Free T4 (Free Thyroxine) (3/23/23 20:25)


Urine Culture (3/23/23 20:47)


Ed Iv/Invasive Line Start (3/23/23 22:19)


Lactated Ringers (Lr 1000 Ml Iv Solution (3/23/23 22:30)





Medications Given in ED





Current Medications








 Medications  Dose


 Ordered  Sig/Theodore


 Route  Start Time


 Stop Time Status Last Admin


Dose Admin


 


 Diphenhydramine


 HCl  50 mg  ONCE  ONCE


 IVP  3/23/23 21:15


 3/23/23 21:16 DC 3/23/23 21:16


50 MG


 


 Ketorolac


 Tromethamine  30 mg  ONCE  ONCE


 IVP  3/23/23 21:15


 3/23/23 21:16 DC 3/23/23 21:16


30 MG


 


 Lactated Ringer's  1,000 ml @ 


 0 mls/hr  Q0M ONCE


 IV  3/23/23 20:45


 3/23/23 20:46 DC 3/23/23 20:50


1,000 MLS/HR


 


 Lactated Ringer's  1,000 ml @ 


 0 mls/hr  Q0M ONCE


 IV  3/23/23 22:30


 3/23/23 22:31 DC 3/23/23 22:35


0 MLS/HR


 


 Ondansetron HCl  4 mg  ONCE  ONCE


 IVP  3/23/23 20:45


 3/23/23 20:46 DC 3/23/23 20:49


4 MG








Vital Signs/I&O











 3/23/23





 20:16


 


Pulse 84


 


Resp 22


 


B/P (MAP) 143/100 (114)


 


Pulse Ox 99


 


O2 Delivery Room Air





Capillary Refill : Less Than 3 Seconds








Blood Pressure Mean:                    114








Progress Note :  


Progress Note





GIVEN:


-IV FLUIDS


-ZOFRAN


-TORADOL


-BENADRYL





NO COMPLAINTS OF CHEST PAIN FOR REMAINDER OF ER STAY





VITALS STABLE





SYMPTOMS IMPROVED AT DISMISSAL





PT IS COVID +


OTHER LABS ARE ALL REASSURING, AND NO EVIDENCE OF LIFE THREATENING PROCESS AT 

THIS TIME








REVIEWED PRIOR RECORDS--2 ER VISITS, ON IN 2021 AND ONE IN 2022 FOR UNRELATED 

COMPLAINTS. 





DISCUSSED TEST RESULTS, SYMPTOMATIC TREATMENT, MEDICATIONS, NEED FOR FOLLOW UP 

AND RETURN PRECAUTIONS DISCUSSED WITH PT AND MALE IN ROOM. 





OFFERED PAXLOVID TREATMENT, AND PT DECLINES AT THIS TIME.





ECG


Initial ECG Impression Date:  Mar 23, 2023


Initial ECG Impression Time:  20:30


Initial ECG Rate:  84


Initial ECG Rhythm:  Normal Sinus


Initial ECG Intervals





QRS 87


QT//404


Initial ECG Comparisson:  No Previous ECG Available


Comment


INTERPRETED BY ME





Departure


Impression





   Primary Impression:  


   COVID-19 virus infection


   Additional Impressions:  


   Dental caries


   Anxiety hyperventilation


   Volume depletion


Disposition:  01 HOME, SELF-CARE


Condition:  Improved





Departure-Patient Inst.


Referrals:  


[facility] (PCP/Family)


Primary Care Physician


Patient Instructions:  Anxiety, Adult ED, COVID-19 (DC), Dental Pain ED, 

Hyperventilation, Tooth Decay, Adult (DC), Dehydration, Adult ED





Add. Discharge Instructions:  


HOME, REST





LOTS OF CLEAR LIQUIDS--WATER, BROTH, JELLO, GATORADE


DRINK ENOUGH SO YOU ARE URINATING EVERY 2-3 HOURS WHILE AWAKE





YOU MAY TAKE TYLENOL 1 GRAM PLUS MOTRIN 800 MG 4 TIMES A DAY FOR PAIN OR FEVER





YOU MAY TAKE OVER THE COUNTER COUGH AND COLD MEDICATIONS IF YOU DEVELOP THOSE 

SYMPTOMS





QUARANTINE X 7 DAYS





KEEP YOUR APPOINTMENT WITH DENTIST AS SCHEDULED





RETURN TO ER IF SYMPTOMS WORSEN





All discharge instructions reviewed with patient and/or family. Voiced 

understanding.


Scripts


Amoxicillin (Amoxicillin) 875 Mg Tablet


875 MG PO BID, #20 TAB


   Prov: SHALINI MCNULTY DO         3/23/23 


Hydroxyzine Pamoate (Hydroxyzine Pamoate) 50 Mg Capsule


50 MG PO Q6H PRN for ANXIETY, #15 CAP


   Prov: SHALINI MCNULTY DO         3/23/23


Work/School Note:  Work Release Form   Date Seen in the Emergency Department:  

Mar 23, 2023


   Return to Work:  Mar 31, 2023











SHALINI MCNULTY DO                 Mar 23, 2023 20:52

## 2023-03-31 ENCOUNTER — HOSPITAL ENCOUNTER (EMERGENCY)
Dept: HOSPITAL 75 - ER | Age: 43
Discharge: HOME | End: 2023-03-31
Payer: MEDICAID

## 2023-03-31 VITALS — HEIGHT: 60.98 IN | WEIGHT: 115.08 LBS | BODY MASS INDEX: 21.73 KG/M2

## 2023-03-31 VITALS — SYSTOLIC BLOOD PRESSURE: 123 MMHG | DIASTOLIC BLOOD PRESSURE: 69 MMHG

## 2023-03-31 DIAGNOSIS — K08.89: ICD-10-CM

## 2023-03-31 DIAGNOSIS — Z28.310: ICD-10-CM

## 2023-03-31 DIAGNOSIS — G89.18: Primary | ICD-10-CM

## 2023-03-31 PROCEDURE — 99283 EMERGENCY DEPT VISIT LOW MDM: CPT

## 2023-03-31 NOTE — ED EENT
History of Present Illness


General


Chief Complaint:  Dental Problems/Pain


Stated Complaint:  DENTAL PAIN


Source:  patient


Exam Limitations:  no limitations





History of Present Illness


Date Seen by Provider:  Mar 31, 2023


Time Seen by Provider:  20:29


Initial Comments


Patient is a 42-year-old female presents ED for left upper jaw pain.  Patient 

states she had 2 left lower molars extracted yesterday at [facility].  She 

started developing pain in her left upper jaw today.  Patient thought she had 

some pus removed from the teeth.  Denies of any gum swelling or redness.  She 

did have sutures placed.  She is concern for dry socket.  Patient has been 

taking anti-inflammatories gargling salt water without much improvement.  Denies

any facial swelling or redness, fever, chills.  Pain appears more in the left 

upper jaw.  Denies fever, chills, nausea, vomit, diarrhea





Allergies and Home Medications


Allergies


Coded Allergies:  


     No Known Drug Allergies (Unverified , 12/22/21)





Patient Home Medication List


Home Medication List Reviewed:  Yes


Amoxicillin (Amoxicillin) 875 Mg Tablet, 875 MG PO BID


   Prescribed by: SHALINI MCNULTY on 3/23/23 2238


Hydrocodone/Acetaminophen (Hydrocodone-Acetamin 5-325 mg) 5 Mg-325 Mg Tablet, 1 

TAB PO Q4H PRN for PAIN-MODERATE (5-7)


   Prescribed by: NAVEEN GORDON on 3/31/23 2034


Hydroxyzine Pamoate (Hydroxyzine Pamoate) 50 Mg Capsule, 50 MG PO Q6H PRN for 

ANXIETY


   Prescribed by: SHALINI MCNULTY on 3/23/23 2238


Nitrofurantoin Monohyd/M-Cryst (Macrobid 100 mg Capsule) 100 Mg Capsule, 1 TAB 

PO BID


   Prescribed by: SHALINI MCNULTY on 5/11/22 0957


Oseltamivir Phosphate (Tamiflu) 75 Mg Cap, 75 MG PO BID


   Prescribed by: DIMAS ALCANTARA on 12/22/21 1113


Prednisone (Prednisone) 20 Mg Tab, 40 MG PO DAILY


   Prescribed by: DIMAS ALCANTARA on 12/22/21 1113


Prednisone (Prednisone) 10 Mg Tab.ds.pk, 10 MG PO DAILY


   Prescribed by: SHALINI MCNULTY on 5/11/22 0957


Tizanidine HCl (Zanaflex) 4 Mg Capsule, 4 MG PO TID


   Prescribed by: SHALINI MCNULTY on 5/11/22 0957


Tramadol HCl (Ultram) 50 Mg Tablet, 50 MG PO Q4H


   Prescribed by: SHALINI MCNULTY on 5/11/22 0958





Review of Systems


Review of Systems


Constitutional:  No chills, No diaphoresis, No malaise, No weakness


Eyes:  Denies Blurred Vision, Denies Decreased Acuity, Denies Photophobia, 

Denies Shadows


Ears:  Denies Dizziness, Denies Pain, Denies Bloody Discharge


Nose:  denies congestion, denies pain, denies bloody discharge


Mouth:  denies loose teeth; pain; denies bloody discharge, denies clear 

discharge


Throat:  denies pain, denies swelling, denies discharge


Respiratory:  No cough, No orthopnea, No short of breath


Cardiovascular:  No chest pain


Gastrointestinal:  No abdominal pain, No nausea, No vomiting





All Other Systems Reviewed


Negative Unless Noted:  Yes





Past Medical-Social-Family Hx


Past Medical History


Surgeries:  No


Respiratory:  No


Cardiac:  No


Neurological:  No


Genitourinary:  No


Gastrointestinal:  No


Musculoskeletal:  No


Endocrine:  No


HEENT:  No


Cancer:  No


Psychosocial:  No


Integumentary:  No


Blood Disorders:  No





Physical Exam


Vital Signs





Vital Signs - First Documented








 3/31/23





 20:21


 


Temp 36.5


 


Pulse 76


 


Resp 18


 


B/P (MAP) 123/69 (87)


 


O2 Delivery Room Air








Height, Weight, BMI


Height: '"


Weight: lbs. oz. kg;  BMI


Method:


General Appearance:  WD/WN, no apparent distress


Eyes:  bilateral eye normal inspection, bilateral eye PERRL, bilateral eye EOMI,

bilateral eye abnormal EOM


Ears:  bilateral ear auricle normal, bilateral ear canal normal, bilateral ear T

M normal


Nose:  normal inspection


Mouth/Throat:  other (No left upper dental tenderness, swelling or redness.  

Recent dental extraction to the left lower molars.  No gum swelling redness, 

exudate or fluctuant mass.  Sutures in place.)


Neck:  non-tender, full range of motion, supple


Cardiovascular:  regular rate, rhythm, no edema, no gallop, no JVD


Respiratory:  chest non-tender, lungs clear, normal breath sounds, no 

respiratory distress, no accessory muscle use


Gastrointestinal:  normal bowel sounds, non tender, soft, no organomegaly


Neurologic/Psychiatric:  CNs II-XII nml as tested, no motor/sensory deficits, 

alert, normal mood/affect, oriented x 3


Skin:  normal color





Progress/Results/Core Measures


Results/Orders


My Orders





Orders - MIRA CONTI


Hydrocodone/Apap 5/325 Tablet (Lortab 5 (3/31/23 20:45)





Medications Given in ED





Current Medications








 Medications  Dose


 Ordered  Sig/Theodore


 Route  Start Time


 Stop Time Status Last Admin


Dose Admin


 


 Acetaminophen/


 Hydrocodone Bitart  1 ea  ONCE  ONCE


 PO  3/31/23 20:45


 3/31/23 20:46 DC 3/31/23 20:50


1 EA








Vital Signs/I&O











 3/31/23





 20:21


 


Temp 36.5


 


Pulse 76


 


Resp 18


 


B/P (MAP) 123/69 (87)


 


O2 Delivery Room Air











Departure


Communication (PCP)


Reviewed previous ER visits, outpatient records, lab testing.  Differential 

diagnosis of dry socket, postop infection.  Patient had 2 left lower molars 

extracted at [facility].  She started complaining pain into her left upper 

jaw today.  On exam wounds appear to be healing.  No evidence of infectious.  

Sutures in place.  No facial swelling or redness.  I would not suspect a dry 

socket to develop just around 24 hours post tooth extraction.  Believe the pain 

is just secondary to post dental extraction.  However if pain progresses would 

recommend further evaluation with her dentist.  Do not think necessarily 

antibiotics is needed.  Discussed swish with warm water.  Cool compresses to 

help with pain.  We will provide a few days worth of pain medication.  If any 

worsening symptoms return back to ED for further evaluation





Impression





   Primary Impression:  


   Pain, dental


Disposition:  01 HOME, SELF-CARE


Condition:  Stable





Departure-Patient Inst.


Decision time for Depature:  20:33


Referrals:  


[facility] (PCP/Family)


Primary Care Physician


Patient Instructions:  Dental Pain





Add. Discharge Instructions:  


Recommend following up with your dentist on Monday.  Continue monitoring.  If 

increased redness, swelling to return back to ED.





All discharge instructions reviewed with patient and/or family. Voiced 

understanding.


Scripts


Hydrocodone/Acetaminophen (Hydrocodone-Acetamin 5-325 mg) 5 Mg-325 Mg Tablet


1 TAB PO Q4H PRN for PAIN-MODERATE (5-7), #8 TAB


   Prov: MIRA CONTI         3/31/23











MIRA CONTI          Mar 31, 2023 20:34

## 2023-08-25 ENCOUNTER — HOSPITAL ENCOUNTER (OUTPATIENT)
Dept: HOSPITAL 75 - RAD | Age: 43
End: 2023-08-25
Payer: MEDICAID

## 2023-08-25 VITALS — HEIGHT: 62.01 IN | WEIGHT: 115.08 LBS | BODY MASS INDEX: 20.91 KG/M2

## 2023-08-25 DIAGNOSIS — R92.1: Primary | ICD-10-CM

## 2023-08-25 DIAGNOSIS — R92.8: ICD-10-CM

## 2023-08-25 PROCEDURE — 19081 BX BREAST 1ST LESION STRTCTC: CPT

## 2023-08-25 NOTE — DIAGNOSTIC IMAGING REPORT
INDICATION: Left breast consultations. 



PROCEDURE: The patient presents for a stereotactic breast biopsy.



The patient was brought is the stereotactic suite and placed in a

chair in the sitting upright position. The left breast was

positioned lateral medial. The calcifications in the upper outer

left breast posterior depth were stereotactically targeted. All

images were viewed on a dedicated workstation. The lateral left

breast was then prepped and draped in the usual sterile fashion.

A small amount of 1% lidocaine was utilized for local anesthesia.

An 8-gauge vacuum-assisted needle was advanced from a lateral

medial approach and placed per stereotactic coordinates. Four

core biopsies were then obtained. Specimen radiograph

demonstrates numerous calcifications within samples labeled #2,

#3 and #4. Marker clip was then deployed. The needle was removed

and hemostasis was obtained. The patient tolerated the procedure

well. The patient was sent for postprocedure 2D CC and ML

mammogram on dedicated mammographic equipment following the

procedure. Images demonstrate a marker clip in the upper outer

left breast.



IMPRESSION: 

1. Successful stereotactic biopsy of calcifications in the upper

outer left breast, utilizing the vacuum-assisted device.

Pathology results are currently pending.



Dictated by: 



  Dictated on workstation # [id number]